# Patient Record
Sex: FEMALE | Race: WHITE | NOT HISPANIC OR LATINO
[De-identification: names, ages, dates, MRNs, and addresses within clinical notes are randomized per-mention and may not be internally consistent; named-entity substitution may affect disease eponyms.]

---

## 2022-05-12 ENCOUNTER — NON-APPOINTMENT (OUTPATIENT)
Age: 42
End: 2022-05-12

## 2024-06-14 ENCOUNTER — APPOINTMENT (OUTPATIENT)
Dept: ORTHOPEDIC SURGERY | Facility: CLINIC | Age: 44
End: 2024-06-14
Payer: COMMERCIAL

## 2024-06-14 VITALS — HEIGHT: 64 IN | WEIGHT: 200 LBS | BODY MASS INDEX: 34.15 KG/M2

## 2024-06-14 DIAGNOSIS — M25.561 PAIN IN RIGHT KNEE: ICD-10-CM

## 2024-06-14 DIAGNOSIS — M23.91 UNSPECIFIED INTERNAL DERANGEMENT OF RIGHT KNEE: ICD-10-CM

## 2024-06-14 PROCEDURE — 73564 X-RAY EXAM KNEE 4 OR MORE: CPT | Mod: 50

## 2024-06-14 PROCEDURE — 99204 OFFICE O/P NEW MOD 45 MIN: CPT

## 2024-06-14 NOTE — DISCUSSION/SUMMARY
[de-identified] : Right knee internal derangement, concerning for MCL injury, mild left knee pain.  Extensive discussion of the natural history of this issue was had with the patient.  We discussed the treatment options focusing on conservative therapy which includes anti-inflammatories, physical therapy/home exercise, & activity modification.   Patient was fitted for a short knee runner brace on the right knee. Patient will return in 1 month for reevaluation.  The patient's current medication management of their orthopedic diagnosis was reviewed today: (1) We discussed a comprehensive treatment plan that included possible pharmaceutical management involving the use of prescription strength medications including but not limited to options such as oral Ibuprofen 400mg QID, once daily Meloxicam 15 mg, or 500-650 mg Tylenol versus over the counter oral medications and topical prescription NSAID Pennsaid vs over the counter Voltaren gel. (2) There is a moderate risk of morbidity with further treatment, especially from use of prescription strength medications and possible side effects of these medications which include upset stomach with oral medications, skin reactions to topical medications and cardiac/renal issues with long term use. (3) I recommended that the patient follow-up with their medical physician to discuss any significant specific potential issues with long term medication use such as interactions with current medications or with exacerbation of underlying medical comorbidities. (4) The benefits and risks associated with use of injectable, oral or topical, prescription and over the counter anti-inflammatory medications were discussed with the patient. The patient voiced understanding of the risks including but not limited to bleeding, stroke, kidney dysfunction, heart disease, and were referred to the black box warning label for further information.

## 2024-06-14 NOTE — HISTORY OF PRESENT ILLNESS
[de-identified] : 6/14/24: Patient presents with bilateral knee pain right worse than left.  This been going on for a while on and off however the last 1.5 weeks she has had significant worse pain in the right knee.  States she slipped on the stairs few weeks ago which may have caused this.  Pain is consistently on the medial aspect of the knee now.  Denies numbness tingling.  Does have some buckling of the right knee.  Tried Advil which provide some relief.  Denies allergies or past medical history.

## 2024-06-14 NOTE — PHYSICAL EXAM
[de-identified] : Right lower extremity Hip: Normal ROM without pain on IR/ER Knee Inspection: no effusion, no erythema. Wounds: None. Alignment: neutral. Palpation: Tender to palpation medial joint line. ROM active (in degrees): 0-120, pain with deep flexion Ligamentous laxity: Pain and mild laxity with valgus stress Meniscal Test: Positive McMurrays, positive Jet. Muscle Test: good quad strength.  Left lower extremity Hip: Normal ROM without pain on IR/ER Knee Inspection: no effusion Wounds: none Alignment: Neutral Palpation: No specific tenderness on palpation. ROM active (in degrees): 0-130 without crepitus or pain through the arc of motion Ligamentous laxity: stable to varus stress test, stable to valgus stress test Meniscal Test: negative McMurrays, negative Jet Muscle Test: good quad strength [de-identified] : 6/14/24: Bilateral knee xrays, standing AP/Lateral and Merchant films, and 45 degree PA standing view taken today in the office are reviewed and demonstrate preserved joint space, no abnormalities

## 2024-08-02 ENCOUNTER — APPOINTMENT (OUTPATIENT)
Dept: ORTHOPEDIC SURGERY | Facility: CLINIC | Age: 44
End: 2024-08-02
Payer: COMMERCIAL

## 2024-08-02 DIAGNOSIS — M23.91 UNSPECIFIED INTERNAL DERANGEMENT OF RIGHT KNEE: ICD-10-CM

## 2024-08-02 PROCEDURE — 99214 OFFICE O/P EST MOD 30 MIN: CPT

## 2024-08-02 NOTE — HISTORY OF PRESENT ILLNESS
[de-identified] : 8/2/24: Patient here for follow-up right knee pain.  States the pain is feeling much better.  She was using the brace for a while but has been using it only on and off.  Occasionally does have some pain on the medial aspect of the knee.  Not taking anything for the pain.  6/14/24: Patient presents with bilateral knee pain right worse than left.  This been going on for a while on and off however the last 1.5 weeks she has had significant worse pain in the right knee.  States she slipped on the stairs few weeks ago which may have caused this.  Pain is consistently on the medial aspect of the knee now.  Denies numbness tingling.  Does have some buckling of the right knee.  Tried Advil which provide some relief.  Denies allergies or past medical history.

## 2024-08-02 NOTE — PHYSICAL EXAM
[de-identified] : Right lower extremity Hip: Normal ROM without pain on IR/ER Knee Inspection: no effusion, no erythema. Wounds: None. Alignment: neutral. Palpation: Nontender palpation ROM active (in degrees): 0-130 Ligamentous laxity: Stable to varus valgus stress, no pain Meniscal Test: Negative McMurrays, negative Jet. Muscle Test: good quad strength.  Left lower extremity Hip: Normal ROM without pain on IR/ER Knee Inspection: no effusion Wounds: none Alignment: Neutral Palpation: No specific tenderness on palpation. ROM active (in degrees): 0-130 without crepitus or pain through the arc of motion Ligamentous laxity: stable to varus stress test, stable to valgus stress test Meniscal Test: negative McMurrays, negative Jet Muscle Test: good quad strength [de-identified] : 6/14/24: Bilateral knee xrays, standing AP/Lateral and Merchant films, and 45 degree PA standing view taken today in the office are reviewed and demonstrate preserved joint space, no abnormalities

## 2024-08-02 NOTE — HISTORY OF PRESENT ILLNESS
[de-identified] : 8/2/24: Patient here for follow-up right knee pain.  States the pain is feeling much better.  She was using the brace for a while but has been using it only on and off.  Occasionally does have some pain on the medial aspect of the knee.  Not taking anything for the pain.  6/14/24: Patient presents with bilateral knee pain right worse than left.  This been going on for a while on and off however the last 1.5 weeks she has had significant worse pain in the right knee.  States she slipped on the stairs few weeks ago which may have caused this.  Pain is consistently on the medial aspect of the knee now.  Denies numbness tingling.  Does have some buckling of the right knee.  Tried Advil which provide some relief.  Denies allergies or past medical history.

## 2024-08-02 NOTE — DISCUSSION/SUMMARY
[de-identified] : Right knee internal derangement, concerning for MCL injury, mild left knee pain.  Extensive discussion of the natural history of this issue was had with the patient.  We discussed the treatment options focusing on conservative therapy which includes anti-inflammatories, physical therapy/home exercise, & activity modification.   Continue knee brace for 1 more month.  Patient may wean out as she is feeling better.  I recommend Voltaren gel as needed.  Patient to follow-up as needed.  The patient's current medication management of their orthopedic diagnosis was reviewed today: (1) We discussed a comprehensive treatment plan that included possible pharmaceutical management involving the use of prescription strength medications including but not limited to options such as oral Ibuprofen 400mg QID, once daily Meloxicam 15 mg, or 500-650 mg Tylenol versus over the counter oral medications and topical prescription NSAID Pennsaid vs over the counter Voltaren gel. (2) There is a moderate risk of morbidity with further treatment, especially from use of prescription strength medications and possible side effects of these medications which include upset stomach with oral medications, skin reactions to topical medications and cardiac/renal issues with long term use. (3) I recommended that the patient follow-up with their medical physician to discuss any significant specific potential issues with long term medication use such as interactions with current medications or with exacerbation of underlying medical comorbidities. (4) The benefits and risks associated with use of injectable, oral or topical, prescription and over the counter anti-inflammatory medications were discussed with the patient. The patient voiced understanding of the risks including but not limited to bleeding, stroke, kidney dysfunction, heart disease, and were referred to the black box warning label for further information.

## 2024-08-02 NOTE — DISCUSSION/SUMMARY
[de-identified] : Right knee internal derangement, concerning for MCL injury, mild left knee pain.  Extensive discussion of the natural history of this issue was had with the patient.  We discussed the treatment options focusing on conservative therapy which includes anti-inflammatories, physical therapy/home exercise, & activity modification.   Continue knee brace for 1 more month.  Patient may wean out as she is feeling better.  I recommend Voltaren gel as needed.  Patient to follow-up as needed.  The patient's current medication management of their orthopedic diagnosis was reviewed today: (1) We discussed a comprehensive treatment plan that included possible pharmaceutical management involving the use of prescription strength medications including but not limited to options such as oral Ibuprofen 400mg QID, once daily Meloxicam 15 mg, or 500-650 mg Tylenol versus over the counter oral medications and topical prescription NSAID Pennsaid vs over the counter Voltaren gel. (2) There is a moderate risk of morbidity with further treatment, especially from use of prescription strength medications and possible side effects of these medications which include upset stomach with oral medications, skin reactions to topical medications and cardiac/renal issues with long term use. (3) I recommended that the patient follow-up with their medical physician to discuss any significant specific potential issues with long term medication use such as interactions with current medications or with exacerbation of underlying medical comorbidities. (4) The benefits and risks associated with use of injectable, oral or topical, prescription and over the counter anti-inflammatory medications were discussed with the patient. The patient voiced understanding of the risks including but not limited to bleeding, stroke, kidney dysfunction, heart disease, and were referred to the black box warning label for further information.

## 2024-08-02 NOTE — PHYSICAL EXAM
[de-identified] : Right lower extremity Hip: Normal ROM without pain on IR/ER Knee Inspection: no effusion, no erythema. Wounds: None. Alignment: neutral. Palpation: Nontender palpation ROM active (in degrees): 0-130 Ligamentous laxity: Stable to varus valgus stress, no pain Meniscal Test: Negative McMurrays, negative Jet. Muscle Test: good quad strength.  Left lower extremity Hip: Normal ROM without pain on IR/ER Knee Inspection: no effusion Wounds: none Alignment: Neutral Palpation: No specific tenderness on palpation. ROM active (in degrees): 0-130 without crepitus or pain through the arc of motion Ligamentous laxity: stable to varus stress test, stable to valgus stress test Meniscal Test: negative McMurrays, negative Jet Muscle Test: good quad strength [de-identified] : 6/14/24: Bilateral knee xrays, standing AP/Lateral and Merchant films, and 45 degree PA standing view taken today in the office are reviewed and demonstrate preserved joint space, no abnormalities  [id number]

## 2025-01-29 ENCOUNTER — NON-APPOINTMENT (OUTPATIENT)
Age: 45
End: 2025-01-29